# Patient Record
Sex: MALE | URBAN - METROPOLITAN AREA
[De-identification: names, ages, dates, MRNs, and addresses within clinical notes are randomized per-mention and may not be internally consistent; named-entity substitution may affect disease eponyms.]

---

## 2017-03-01 ENCOUNTER — INPATIENT (INPATIENT)
Facility: HOSPITAL | Age: 32
LOS: 3 days | Discharge: HOME | DRG: 639 | End: 2017-03-05
Attending: INTERNAL MEDICINE | Admitting: INTERNAL MEDICINE
Payer: MEDICAID

## 2017-03-01 VITALS
OXYGEN SATURATION: 96 % | RESPIRATION RATE: 18 BRPM | DIASTOLIC BLOOD PRESSURE: 80 MMHG | SYSTOLIC BLOOD PRESSURE: 138 MMHG | TEMPERATURE: 97 F | HEART RATE: 100 BPM | HEIGHT: 71 IN | WEIGHT: 220.02 LBS

## 2017-03-01 DIAGNOSIS — R73.9 HYPERGLYCEMIA, UNSPECIFIED: ICD-10-CM

## 2017-03-01 PROBLEM — Z00.00 ENCOUNTER FOR PREVENTIVE HEALTH EXAMINATION: Status: ACTIVE | Noted: 2017-03-01

## 2017-03-01 LAB
ALBUMIN SERPL ELPH-MCNC: 4.7 G/DL — SIGNIFICANT CHANGE UP (ref 3.3–5)
ALP SERPL-CCNC: 161 U/L — HIGH (ref 40–120)
ALT FLD-CCNC: 48 U/L RC — HIGH (ref 10–45)
ANION GAP SERPL CALC-SCNC: 20 MMOL/L — HIGH (ref 5–17)
APPEARANCE UR: CLEAR — SIGNIFICANT CHANGE UP
AST SERPL-CCNC: 18 U/L — SIGNIFICANT CHANGE UP (ref 10–40)
BASOPHILS # BLD AUTO: 0 K/UL — SIGNIFICANT CHANGE UP (ref 0–0.2)
BASOPHILS NFR BLD AUTO: 0.2 % — SIGNIFICANT CHANGE UP (ref 0–2)
BILIRUB SERPL-MCNC: 0.7 MG/DL — SIGNIFICANT CHANGE UP (ref 0.2–1.2)
BILIRUB UR-MCNC: NEGATIVE — SIGNIFICANT CHANGE UP
BUN SERPL-MCNC: 14 MG/DL — SIGNIFICANT CHANGE UP (ref 7–23)
CALCIUM SERPL-MCNC: 9.8 MG/DL — SIGNIFICANT CHANGE UP (ref 8.4–10.5)
CHLORIDE SERPL-SCNC: 86 MMOL/L — LOW (ref 96–108)
CO2 SERPL-SCNC: 22 MMOL/L — SIGNIFICANT CHANGE UP (ref 22–31)
COLOR SPEC: COLORLESS — SIGNIFICANT CHANGE UP
CREAT SERPL-MCNC: 0.96 MG/DL — SIGNIFICANT CHANGE UP (ref 0.5–1.3)
DIFF PNL FLD: NEGATIVE — SIGNIFICANT CHANGE UP
EOSINOPHIL # BLD AUTO: 0.1 K/UL — SIGNIFICANT CHANGE UP (ref 0–0.5)
EOSINOPHIL NFR BLD AUTO: 0.3 % — SIGNIFICANT CHANGE UP (ref 0–6)
GAS PNL BLDV: SIGNIFICANT CHANGE UP
GLUCOSE SERPL-MCNC: 737 MG/DL — CRITICAL HIGH (ref 70–99)
GLUCOSE UR QL: >1000
HBA1C BLD-MCNC: 14 % — HIGH (ref 4–5.6)
HCT VFR BLD CALC: 48.6 % — SIGNIFICANT CHANGE UP (ref 39–50)
HGB BLD-MCNC: 16.7 G/DL — SIGNIFICANT CHANGE UP (ref 13–17)
HIV 1 & 2 AB SERPL IA.RAPID: SIGNIFICANT CHANGE UP
KETONES UR-MCNC: ABNORMAL
LEUKOCYTE ESTERASE UR-ACNC: NEGATIVE — SIGNIFICANT CHANGE UP
LYMPHOCYTES # BLD AUTO: 1.4 K/UL — SIGNIFICANT CHANGE UP (ref 1–3.3)
LYMPHOCYTES # BLD AUTO: 9.2 % — LOW (ref 13–44)
MCHC RBC-ENTMCNC: 30.6 PG — SIGNIFICANT CHANGE UP (ref 27–34)
MCHC RBC-ENTMCNC: 34.3 GM/DL — SIGNIFICANT CHANGE UP (ref 32–36)
MCV RBC AUTO: 89.1 FL — SIGNIFICANT CHANGE UP (ref 80–100)
MONOCYTES # BLD AUTO: 0.8 K/UL — SIGNIFICANT CHANGE UP (ref 0–0.9)
MONOCYTES NFR BLD AUTO: 5.1 % — SIGNIFICANT CHANGE UP (ref 2–14)
NEUTROPHILS # BLD AUTO: 13.4 K/UL — HIGH (ref 1.8–7.4)
NEUTROPHILS NFR BLD AUTO: 85.2 % — HIGH (ref 43–77)
NITRITE UR-MCNC: NEGATIVE — SIGNIFICANT CHANGE UP
PH UR: 5.5 — SIGNIFICANT CHANGE UP (ref 4.8–8)
PLATELET # BLD AUTO: 282 K/UL — SIGNIFICANT CHANGE UP (ref 150–400)
POTASSIUM SERPL-MCNC: 4.5 MMOL/L — SIGNIFICANT CHANGE UP (ref 3.5–5.3)
POTASSIUM SERPL-SCNC: 4.5 MMOL/L — SIGNIFICANT CHANGE UP (ref 3.5–5.3)
PROT SERPL-MCNC: 7.1 G/DL — SIGNIFICANT CHANGE UP (ref 6–8.3)
PROT UR-MCNC: NEGATIVE — SIGNIFICANT CHANGE UP
RAPID RVP RESULT: SIGNIFICANT CHANGE UP
RBC # BLD: 5.46 M/UL — SIGNIFICANT CHANGE UP (ref 4.2–5.8)
RBC # FLD: 12 % — SIGNIFICANT CHANGE UP (ref 10.3–14.5)
RBC CASTS # UR COMP ASSIST: SIGNIFICANT CHANGE UP /HPF (ref 0–2)
SODIUM SERPL-SCNC: 128 MMOL/L — LOW (ref 135–145)
SP GR SPEC: >1.03 — HIGH (ref 1.01–1.02)
UROBILINOGEN FLD QL: NEGATIVE — SIGNIFICANT CHANGE UP
WBC # BLD: 15.7 K/UL — HIGH (ref 3.8–10.5)
WBC # FLD AUTO: 15.7 K/UL — HIGH (ref 3.8–10.5)
WBC UR QL: SIGNIFICANT CHANGE UP /HPF (ref 0–5)

## 2017-03-01 PROCEDURE — 99285 EMERGENCY DEPT VISIT HI MDM: CPT

## 2017-03-01 PROCEDURE — 71010: CPT | Mod: 26

## 2017-03-01 RX ORDER — SODIUM CHLORIDE 9 MG/ML
1000 INJECTION INTRAMUSCULAR; INTRAVENOUS; SUBCUTANEOUS
Qty: 0 | Refills: 0 | Status: DISCONTINUED | OUTPATIENT
Start: 2017-03-01 | End: 2017-03-01

## 2017-03-01 RX ORDER — DEXTROSE 50 % IN WATER 50 %
25 SYRINGE (ML) INTRAVENOUS ONCE
Qty: 0 | Refills: 0 | Status: DISCONTINUED | OUTPATIENT
Start: 2017-03-01 | End: 2017-03-05

## 2017-03-01 RX ORDER — GLUCAGON INJECTION, SOLUTION 0.5 MG/.1ML
1 INJECTION, SOLUTION SUBCUTANEOUS ONCE
Qty: 0 | Refills: 0 | Status: DISCONTINUED | OUTPATIENT
Start: 2017-03-01 | End: 2017-03-05

## 2017-03-01 RX ORDER — DEXTROSE 50 % IN WATER 50 %
1 SYRINGE (ML) INTRAVENOUS ONCE
Qty: 0 | Refills: 0 | Status: DISCONTINUED | OUTPATIENT
Start: 2017-03-01 | End: 2017-03-05

## 2017-03-01 RX ORDER — INSULIN GLARGINE 100 [IU]/ML
20 INJECTION, SOLUTION SUBCUTANEOUS AT BEDTIME
Qty: 0 | Refills: 0 | Status: DISCONTINUED | OUTPATIENT
Start: 2017-03-01 | End: 2017-03-02

## 2017-03-01 RX ORDER — INSULIN GLARGINE 100 [IU]/ML
15 INJECTION, SOLUTION SUBCUTANEOUS AT BEDTIME
Qty: 0 | Refills: 0 | Status: DISCONTINUED | OUTPATIENT
Start: 2017-03-01 | End: 2017-03-01

## 2017-03-01 RX ORDER — ENOXAPARIN SODIUM 100 MG/ML
40 INJECTION SUBCUTANEOUS EVERY 24 HOURS
Qty: 0 | Refills: 0 | Status: DISCONTINUED | OUTPATIENT
Start: 2017-03-01 | End: 2017-03-05

## 2017-03-01 RX ORDER — INSULIN HUMAN 100 [IU]/ML
10 INJECTION, SOLUTION SUBCUTANEOUS ONCE
Qty: 0 | Refills: 0 | Status: COMPLETED | OUTPATIENT
Start: 2017-03-01 | End: 2017-03-01

## 2017-03-01 RX ORDER — SODIUM CHLORIDE 9 MG/ML
1000 INJECTION INTRAMUSCULAR; INTRAVENOUS; SUBCUTANEOUS ONCE
Qty: 0 | Refills: 0 | Status: COMPLETED | OUTPATIENT
Start: 2017-03-01 | End: 2017-03-01

## 2017-03-01 RX ORDER — DEXTROSE 50 % IN WATER 50 %
12.5 SYRINGE (ML) INTRAVENOUS ONCE
Qty: 0 | Refills: 0 | Status: DISCONTINUED | OUTPATIENT
Start: 2017-03-01 | End: 2017-03-05

## 2017-03-01 RX ORDER — INSULIN LISPRO 100/ML
VIAL (ML) SUBCUTANEOUS AT BEDTIME
Qty: 0 | Refills: 0 | Status: DISCONTINUED | OUTPATIENT
Start: 2017-03-01 | End: 2017-03-05

## 2017-03-01 RX ORDER — ONDANSETRON 8 MG/1
4 TABLET, FILM COATED ORAL EVERY 6 HOURS
Qty: 0 | Refills: 0 | Status: DISCONTINUED | OUTPATIENT
Start: 2017-03-01 | End: 2017-03-05

## 2017-03-01 RX ORDER — INSULIN LISPRO 100/ML
VIAL (ML) SUBCUTANEOUS
Qty: 0 | Refills: 0 | Status: DISCONTINUED | OUTPATIENT
Start: 2017-03-01 | End: 2017-03-05

## 2017-03-01 RX ORDER — INSULIN LISPRO 100/ML
8 VIAL (ML) SUBCUTANEOUS
Qty: 0 | Refills: 0 | Status: DISCONTINUED | OUTPATIENT
Start: 2017-03-01 | End: 2017-03-02

## 2017-03-01 RX ORDER — SODIUM CHLORIDE 9 MG/ML
1000 INJECTION, SOLUTION INTRAVENOUS
Qty: 0 | Refills: 0 | Status: DISCONTINUED | OUTPATIENT
Start: 2017-03-01 | End: 2017-03-05

## 2017-03-01 RX ORDER — INSULIN LISPRO 100/ML
5 VIAL (ML) SUBCUTANEOUS
Qty: 0 | Refills: 0 | Status: DISCONTINUED | OUTPATIENT
Start: 2017-03-01 | End: 2017-03-01

## 2017-03-01 RX ADMIN — INSULIN HUMAN 10 UNIT(S): 100 INJECTION, SOLUTION SUBCUTANEOUS at 14:05

## 2017-03-01 RX ADMIN — Medication 8: at 22:29

## 2017-03-01 RX ADMIN — INSULIN GLARGINE 20 UNIT(S): 100 INJECTION, SOLUTION SUBCUTANEOUS at 22:30

## 2017-03-01 RX ADMIN — Medication 8 UNIT(S): at 18:32

## 2017-03-01 RX ADMIN — Medication 1 APPLICATION(S): at 14:40

## 2017-03-01 RX ADMIN — Medication 6: at 18:32

## 2017-03-01 RX ADMIN — ENOXAPARIN SODIUM 40 MILLIGRAM(S): 100 INJECTION SUBCUTANEOUS at 18:48

## 2017-03-01 RX ADMIN — SODIUM CHLORIDE 1000 MILLILITER(S): 9 INJECTION INTRAMUSCULAR; INTRAVENOUS; SUBCUTANEOUS at 13:05

## 2017-03-01 RX ADMIN — SODIUM CHLORIDE 1000 MILLILITER(S): 9 INJECTION INTRAMUSCULAR; INTRAVENOUS; SUBCUTANEOUS at 12:00

## 2017-03-01 NOTE — ED ADULT NURSE NOTE - OBJECTIVE STATEMENT
1130 31 yr old male c/o polyuria, polydipsia, and fatigue x 1 wk associated with wt loss Fingerstick was done twice by ER PCA upon arrival to   ER red registering  high without a #. A&Ox3. ambulatory. denies fever or chills .Also  c/o feeling bloated. abd soft. Lungs clear

## 2017-03-01 NOTE — ED PROVIDER NOTE - PROGRESS NOTE DETAILS
Symptoms concerning for new DM. Elevated glucose. Labs ordered. Mild gap of 20, elevated glucose of 737.  Insulin administered. Discussed with Dr. Harper to admit for hyperglycemia

## 2017-03-01 NOTE — H&P ADULT. - ASSESSMENT
31 yr old male with no PMH c/o polyuria, polydipsia, and fatigue x 1 wk associated with wt loss. In the ER pt found to have FS of 700 , so admitted for new onset dialbetes. No other associated symptoms

## 2017-03-01 NOTE — ED PROVIDER NOTE - OBJECTIVE STATEMENT
32 yo m with no PMH presents with chief complaint of nausea, generalized weakness, polyuria and polydipsia for the past 2 weeks. Reports weight loss of 15 pounds. Also reporting a white kurd like substance on his penis. The patient also is reporting less PO intake.

## 2017-03-01 NOTE — ED ADULT NURSE NOTE - ED STAT RN HANDOFF DETAILS
hand off given to oncoming RN Krista Montoya. 1450 Fingerstick repeated 359. A&Ox3. No c/o pain. Received 2 liters NS IV

## 2017-03-01 NOTE — ED PROVIDER NOTE - MEDICAL DECISION MAKING DETAILS
[age] yo male presents with days of nausea, polydipsia, polyuria, and fungal infection of penis; fs critical in ed; on exam nad, lungs cta, nontender abdomen, gu exam c/w fungal infection; Plan: r/o dka, labs, ua, cxr, clotrimazole, glucose control, admission

## 2017-03-02 LAB
ALBUMIN SERPL ELPH-MCNC: 3.6 G/DL — SIGNIFICANT CHANGE UP (ref 3.3–5)
ALP SERPL-CCNC: 115 U/L — SIGNIFICANT CHANGE UP (ref 40–120)
ALT FLD-CCNC: 33 U/L — SIGNIFICANT CHANGE UP (ref 10–45)
ANION GAP SERPL CALC-SCNC: 14 MMOL/L — SIGNIFICANT CHANGE UP (ref 5–17)
AST SERPL-CCNC: 17 U/L — SIGNIFICANT CHANGE UP (ref 10–40)
B-OH-BUTYR SERPL-SCNC: 0.8 MMOL/L — HIGH
BILIRUB SERPL-MCNC: 0.4 MG/DL — SIGNIFICANT CHANGE UP (ref 0.2–1.2)
BUN SERPL-MCNC: 10 MG/DL — SIGNIFICANT CHANGE UP (ref 7–23)
CALCIUM SERPL-MCNC: 8.9 MG/DL — SIGNIFICANT CHANGE UP (ref 8.4–10.5)
CHLORIDE SERPL-SCNC: 98 MMOL/L — SIGNIFICANT CHANGE UP (ref 96–108)
CO2 SERPL-SCNC: 22 MMOL/L — SIGNIFICANT CHANGE UP (ref 22–31)
CREAT SERPL-MCNC: 1.07 MG/DL — SIGNIFICANT CHANGE UP (ref 0.5–1.3)
CULTURE RESULTS: NO GROWTH — SIGNIFICANT CHANGE UP
GLUCOSE SERPL-MCNC: 484 MG/DL — CRITICAL HIGH (ref 70–99)
HCT VFR BLD CALC: 41 % — SIGNIFICANT CHANGE UP (ref 39–50)
HGB BLD-MCNC: 14.8 G/DL — SIGNIFICANT CHANGE UP (ref 13–17)
MCHC RBC-ENTMCNC: 31 PG — SIGNIFICANT CHANGE UP (ref 27–34)
MCHC RBC-ENTMCNC: 36.1 GM/DL — HIGH (ref 32–36)
MCV RBC AUTO: 85.8 FL — SIGNIFICANT CHANGE UP (ref 80–100)
PLATELET # BLD AUTO: 233 K/UL — SIGNIFICANT CHANGE UP (ref 150–400)
POTASSIUM SERPL-MCNC: 3.9 MMOL/L — SIGNIFICANT CHANGE UP (ref 3.5–5.3)
POTASSIUM SERPL-SCNC: 3.9 MMOL/L — SIGNIFICANT CHANGE UP (ref 3.5–5.3)
PROT SERPL-MCNC: 6.1 G/DL — SIGNIFICANT CHANGE UP (ref 6–8.3)
RBC # BLD: 4.78 M/UL — SIGNIFICANT CHANGE UP (ref 4.2–5.8)
RBC # FLD: 12.6 % — SIGNIFICANT CHANGE UP (ref 10.3–14.5)
SODIUM SERPL-SCNC: 134 MMOL/L — LOW (ref 135–145)
SPECIMEN SOURCE: SIGNIFICANT CHANGE UP
WBC # BLD: 11.53 K/UL — HIGH (ref 3.8–10.5)
WBC # FLD AUTO: 11.53 K/UL — HIGH (ref 3.8–10.5)

## 2017-03-02 RX ORDER — INSULIN GLARGINE 100 [IU]/ML
40 INJECTION, SOLUTION SUBCUTANEOUS AT BEDTIME
Qty: 0 | Refills: 0 | Status: DISCONTINUED | OUTPATIENT
Start: 2017-03-02 | End: 2017-03-03

## 2017-03-02 RX ORDER — ACETAMINOPHEN 500 MG
650 TABLET ORAL ONCE
Qty: 0 | Refills: 0 | Status: COMPLETED | OUTPATIENT
Start: 2017-03-02 | End: 2017-03-02

## 2017-03-02 RX ORDER — INSULIN LISPRO 100/ML
16 VIAL (ML) SUBCUTANEOUS
Qty: 0 | Refills: 0 | Status: DISCONTINUED | OUTPATIENT
Start: 2017-03-02 | End: 2017-03-03

## 2017-03-02 RX ORDER — INSULIN LISPRO 100/ML
10 VIAL (ML) SUBCUTANEOUS
Qty: 0 | Refills: 0 | Status: DISCONTINUED | OUTPATIENT
Start: 2017-03-02 | End: 2017-03-02

## 2017-03-02 RX ORDER — SODIUM CHLORIDE 9 MG/ML
1000 INJECTION INTRAMUSCULAR; INTRAVENOUS; SUBCUTANEOUS
Qty: 0 | Refills: 0 | Status: DISCONTINUED | OUTPATIENT
Start: 2017-03-02 | End: 2017-03-03

## 2017-03-02 RX ADMIN — Medication 10 UNIT(S): at 12:13

## 2017-03-02 RX ADMIN — Medication 650 MILLIGRAM(S): at 22:15

## 2017-03-02 RX ADMIN — INSULIN GLARGINE 40 UNIT(S): 100 INJECTION, SOLUTION SUBCUTANEOUS at 22:00

## 2017-03-02 RX ADMIN — SODIUM CHLORIDE 150 MILLILITER(S): 9 INJECTION INTRAMUSCULAR; INTRAVENOUS; SUBCUTANEOUS at 19:57

## 2017-03-02 RX ADMIN — Medication 10 UNIT(S): at 08:24

## 2017-03-02 RX ADMIN — Medication 650 MILLIGRAM(S): at 23:15

## 2017-03-02 RX ADMIN — Medication 4: at 22:00

## 2017-03-02 RX ADMIN — Medication 5: at 08:23

## 2017-03-02 RX ADMIN — Medication 4: at 12:13

## 2017-03-02 RX ADMIN — Medication 16 UNIT(S): at 17:10

## 2017-03-02 RX ADMIN — SODIUM CHLORIDE 150 MILLILITER(S): 9 INJECTION INTRAMUSCULAR; INTRAVENOUS; SUBCUTANEOUS at 05:58

## 2017-03-02 RX ADMIN — SODIUM CHLORIDE 150 MILLILITER(S): 9 INJECTION INTRAMUSCULAR; INTRAVENOUS; SUBCUTANEOUS at 17:11

## 2017-03-02 RX ADMIN — Medication 4: at 17:09

## 2017-03-02 RX ADMIN — ENOXAPARIN SODIUM 40 MILLIGRAM(S): 100 INJECTION SUBCUTANEOUS at 18:18

## 2017-03-02 NOTE — DIETITIAN INITIAL EVALUATION ADULT. - NS AS NUTRI INTERV ED CONTENT3
Provided in depth diabetes diet education including reviewing which foods contain carbohydrate, portion sizes using my plate method, choosing whole grains instead of refined grains, combining protein with carbohydrate, basic carbohydrate counting and eating carbs consistently throughout the day, avoidance of concentrated sweets, maintaining a healthy weight, physical activity, and signs/symptoms and management of hypoglycemia-pt and family receptive to education, accepted written materials

## 2017-03-02 NOTE — DIETITIAN INITIAL EVALUATION ADULT. - NS AS NUTRI INTERV MEALS SNACK
1. Continue consistent carbohydrate diet as ordered, 2. Continue to review diet education as able, 3. Monitor PO intake, diet tolerance, weight trends, labs, and skin integrity, 4. RD to remain available as needed

## 2017-03-02 NOTE — DIETITIAN INITIAL EVALUATION ADULT. - ENERGY NEEDS
Pt seen for consult for new dx DM. Pt with no PMH admitted with polydipsia, polyuria, fatigue associated with wt loss, FS of 700 now found to have newly dx type 2 DM-endocrine following, diabetes education consult pending.     Ht:5'11" , Wt:201 lbs, BMI:28 kg/m2, IBW:172 lbs +/- 10%, %IBW: 117%  No edema, Skin intact

## 2017-03-02 NOTE — DIETITIAN INITIAL EVALUATION ADULT. - SIGNS/SYMPTOMS
Pt consuming <50%  baseline intake x 2-3 weeks, ~17 lb wt loss within 2-3 weeks Pt with newly dx type 2 DM

## 2017-03-02 NOTE — DIETITIAN INITIAL EVALUATION ADULT. - ORAL INTAKE PTA
poor/Pt reports decreased appetite/intake for 2-3 weeks PTA due to fatigue and excessive thirst, pt stated he would eat maybe 1 meal per day and drink fluids such as juices, sodas, water and other beverages. Prior to this pt was eating normally 3 meals per day plus snacks. Breakfast: eggs, hash browns, grilled cheese, Lunch: white rice, fried chicken, chinese food, Dinner: protein, starch, veggie. poor/Pt reports decreased appetite/intake for 2-3 weeks PTA due to fatigue and excessive thirst, pt stated he would eat maybe 1 meal per day and drink fluids such as juices, sodas, water and other beverages. Prior to this pt was eating normally 3 meals per day plus snacks. Breakfast: eggs, hash browns, grilled cheese, Lunch: white rice, fried chicken, chinese food, Dinner: protein, starch, veggie. Pt mostly drinks juices, vitamin water, gatorade, soda, etc.

## 2017-03-02 NOTE — DIETITIAN INITIAL EVALUATION ADULT. - OTHER INFO
Pt reports UBW of 218-220 lbs, pt stated he lost weight to lowest weight of 193 lbs-per pt he was weighed this yesterday in house, ? accuracy noted admission weight of 201 lbs (3/1). Pt with no food allergies, no micronutrient supplementation at home. Pt reports intermittent vomiting at home from over consuming fluids, no issues in house. Pt with last BM today. No difficulty chewing/swallowing. In house pt reports eating better 100% of meals plus foods from [employer].

## 2017-03-03 LAB
ANION GAP SERPL CALC-SCNC: 13 MMOL/L — SIGNIFICANT CHANGE UP (ref 5–17)
BUN SERPL-MCNC: 9 MG/DL — SIGNIFICANT CHANGE UP (ref 7–23)
CALCIUM SERPL-MCNC: 8.2 MG/DL — LOW (ref 8.4–10.5)
CHLORIDE SERPL-SCNC: 102 MMOL/L — SIGNIFICANT CHANGE UP (ref 96–108)
CK MB CFR SERPL CALC: 2.5 NG/ML — SIGNIFICANT CHANGE UP (ref 0–6.7)
CK MB CFR SERPL CALC: 2.7 NG/ML — SIGNIFICANT CHANGE UP (ref 0–6.7)
CK MB CFR SERPL CALC: 2.8 NG/ML — SIGNIFICANT CHANGE UP (ref 0–6.7)
CK SERPL-CCNC: 69 U/L — SIGNIFICANT CHANGE UP (ref 30–200)
CK SERPL-CCNC: 85 U/L — SIGNIFICANT CHANGE UP (ref 30–200)
CK SERPL-CCNC: 86 U/L — SIGNIFICANT CHANGE UP (ref 30–200)
CO2 SERPL-SCNC: 21 MMOL/L — LOW (ref 22–31)
CREAT SERPL-MCNC: 0.8 MG/DL — SIGNIFICANT CHANGE UP (ref 0.5–1.3)
GLUCOSE SERPL-MCNC: 232 MG/DL — HIGH (ref 70–99)
HCT VFR BLD CALC: 38.7 % — LOW (ref 39–50)
HGB BLD-MCNC: 13.4 G/DL — SIGNIFICANT CHANGE UP (ref 13–17)
MAGNESIUM SERPL-MCNC: 1.7 MG/DL — SIGNIFICANT CHANGE UP (ref 1.6–2.6)
MCHC RBC-ENTMCNC: 29.8 PG — SIGNIFICANT CHANGE UP (ref 27–34)
MCHC RBC-ENTMCNC: 34.6 GM/DL — SIGNIFICANT CHANGE UP (ref 32–36)
MCV RBC AUTO: 86.2 FL — SIGNIFICANT CHANGE UP (ref 80–100)
PHOSPHATE SERPL-MCNC: 3 MG/DL — SIGNIFICANT CHANGE UP (ref 2.5–4.5)
PLATELET # BLD AUTO: 228 K/UL — SIGNIFICANT CHANGE UP (ref 150–400)
POTASSIUM SERPL-MCNC: 3.6 MMOL/L — SIGNIFICANT CHANGE UP (ref 3.5–5.3)
POTASSIUM SERPL-SCNC: 3.6 MMOL/L — SIGNIFICANT CHANGE UP (ref 3.5–5.3)
RBC # BLD: 4.49 M/UL — SIGNIFICANT CHANGE UP (ref 4.2–5.8)
RBC # FLD: 12.9 % — SIGNIFICANT CHANGE UP (ref 10.3–14.5)
SODIUM SERPL-SCNC: 136 MMOL/L — SIGNIFICANT CHANGE UP (ref 135–145)
TROPONIN T SERPL-MCNC: <0.01 NG/ML — SIGNIFICANT CHANGE UP (ref 0–0.06)
WBC # BLD: 9.62 K/UL — SIGNIFICANT CHANGE UP (ref 3.8–10.5)
WBC # FLD AUTO: 9.62 K/UL — SIGNIFICANT CHANGE UP (ref 3.8–10.5)

## 2017-03-03 PROCEDURE — 93010 ELECTROCARDIOGRAM REPORT: CPT

## 2017-03-03 PROCEDURE — 71010: CPT | Mod: 26

## 2017-03-03 RX ORDER — INSULIN LISPRO 100/ML
20 VIAL (ML) SUBCUTANEOUS
Qty: 0 | Refills: 0 | Status: DISCONTINUED | OUTPATIENT
Start: 2017-03-03 | End: 2017-03-04

## 2017-03-03 RX ORDER — ASPIRIN/CALCIUM CARB/MAGNESIUM 324 MG
325 TABLET ORAL ONCE
Qty: 0 | Refills: 0 | Status: COMPLETED | OUTPATIENT
Start: 2017-03-03 | End: 2017-03-03

## 2017-03-03 RX ORDER — INSULIN GLARGINE 100 [IU]/ML
50 INJECTION, SOLUTION SUBCUTANEOUS AT BEDTIME
Qty: 0 | Refills: 0 | Status: DISCONTINUED | OUTPATIENT
Start: 2017-03-03 | End: 2017-03-04

## 2017-03-03 RX ORDER — SODIUM CHLORIDE 9 MG/ML
1000 INJECTION INTRAMUSCULAR; INTRAVENOUS; SUBCUTANEOUS
Qty: 0 | Refills: 0 | Status: DISCONTINUED | OUTPATIENT
Start: 2017-03-03 | End: 2017-03-05

## 2017-03-03 RX ADMIN — Medication 3: at 11:24

## 2017-03-03 RX ADMIN — INSULIN GLARGINE 50 UNIT(S): 100 INJECTION, SOLUTION SUBCUTANEOUS at 21:57

## 2017-03-03 RX ADMIN — SODIUM CHLORIDE 150 MILLILITER(S): 9 INJECTION INTRAMUSCULAR; INTRAVENOUS; SUBCUTANEOUS at 02:43

## 2017-03-03 RX ADMIN — Medication: at 21:57

## 2017-03-03 RX ADMIN — Medication 325 MILLIGRAM(S): at 06:34

## 2017-03-03 RX ADMIN — Medication 16 UNIT(S): at 08:31

## 2017-03-03 RX ADMIN — Medication 20 UNIT(S): at 17:07

## 2017-03-03 RX ADMIN — Medication 1: at 08:31

## 2017-03-03 RX ADMIN — Medication 16 UNIT(S): at 11:24

## 2017-03-03 RX ADMIN — Medication 4: at 17:07

## 2017-03-04 PROCEDURE — 93306 TTE W/DOPPLER COMPLETE: CPT | Mod: 26

## 2017-03-04 RX ORDER — INSULIN GLARGINE 100 [IU]/ML
60 INJECTION, SOLUTION SUBCUTANEOUS AT BEDTIME
Qty: 0 | Refills: 0 | Status: DISCONTINUED | OUTPATIENT
Start: 2017-03-04 | End: 2017-03-05

## 2017-03-04 RX ORDER — INSULIN LISPRO 100/ML
26 VIAL (ML) SUBCUTANEOUS
Qty: 0 | Refills: 0 | Status: DISCONTINUED | OUTPATIENT
Start: 2017-03-04 | End: 2017-03-05

## 2017-03-04 RX ADMIN — ENOXAPARIN SODIUM 40 MILLIGRAM(S): 100 INJECTION SUBCUTANEOUS at 17:10

## 2017-03-04 RX ADMIN — Medication 20 UNIT(S): at 12:34

## 2017-03-04 RX ADMIN — Medication 3: at 12:32

## 2017-03-04 RX ADMIN — Medication 2: at 17:09

## 2017-03-04 RX ADMIN — Medication 20 UNIT(S): at 08:21

## 2017-03-04 RX ADMIN — INSULIN GLARGINE 60 UNIT(S): 100 INJECTION, SOLUTION SUBCUTANEOUS at 23:27

## 2017-03-04 RX ADMIN — Medication 20 UNIT(S): at 17:10

## 2017-03-04 RX ADMIN — Medication 2: at 08:21

## 2017-03-04 NOTE — PROVIDER CONTACT NOTE (OTHER) - BACKGROUND
31 yr old male with no PMH c/o polyuria, polydipsia, and fatigue x 1 wk associated with wt loss. In the ER pt found to have FS of 700 ,
30y/o male admitted for hyperglycemia
Pt came in with elevated blood glucose and diagnosed with new onset of DM

## 2017-03-04 NOTE — PROVIDER CONTACT NOTE (OTHER) - ACTION/TREATMENT ORDERED:
NP made aware
NP Gump made aware. Ordered cardiac enzymes and cardiac work-up. CXR ordered. 2nd EKG done at 0618 with sinus bradycardia. O2 NC at 3L. Aspirin 325mg ordered when pt refused morhpine. Monitor pt
Med given as ordered.  No distress or discomfort noted.  Safety and comfort measure maintained.  Will continue to monitor.

## 2017-03-04 NOTE — PROVIDER CONTACT NOTE (OTHER) - ASSESSMENT
Pt said, old IV lock came off accidently when he shave . Pt refused to place new IV lock despite multiple encouragement,pt said he will leave tommorow. Educated about the importance of IV lock in emergency situation especially pt is diabetic
FS at 1830 came out 598, repeated immediately and resulted HI. No s/sx of high blood glucose noted at this time.  No distress or discomfort noted.
pt c/o chest pain when taking deep breaths. Vitals 116/74, heart rate 66, RR 18, 98% on RA. EKG done with sinus bradycardia sinus arrhythmia.

## 2017-03-05 VITALS
OXYGEN SATURATION: 96 % | SYSTOLIC BLOOD PRESSURE: 111 MMHG | DIASTOLIC BLOOD PRESSURE: 65 MMHG | RESPIRATION RATE: 18 BRPM | TEMPERATURE: 98 F | HEART RATE: 84 BPM

## 2017-03-05 PROCEDURE — 93018 CV STRESS TEST I&R ONLY: CPT

## 2017-03-05 PROCEDURE — 93016 CV STRESS TEST SUPVJ ONLY: CPT

## 2017-03-05 RX ORDER — ENOXAPARIN SODIUM 100 MG/ML
60 INJECTION SUBCUTANEOUS
Qty: 1 | Refills: 0 | OUTPATIENT
Start: 2017-03-05

## 2017-03-05 RX ORDER — INSULIN LISPRO 100/ML
20 VIAL (ML) SUBCUTANEOUS
Qty: 1 | Refills: 0 | OUTPATIENT
Start: 2017-03-05 | End: 2017-04-04

## 2017-03-05 RX ORDER — INSULIN LISPRO 100/ML
10 VIAL (ML) SUBCUTANEOUS ONCE
Qty: 0 | Refills: 0 | Status: COMPLETED | OUTPATIENT
Start: 2017-03-05 | End: 2017-03-05

## 2017-03-05 RX ADMIN — Medication 10 UNIT(S): at 13:38

## 2017-03-05 RX ADMIN — Medication 26 UNIT(S): at 17:09

## 2017-03-05 RX ADMIN — Medication 26 UNIT(S): at 08:06

## 2017-03-05 RX ADMIN — Medication: at 17:09

## 2017-03-05 RX ADMIN — Medication 2: at 08:05

## 2017-03-05 RX ADMIN — ENOXAPARIN SODIUM 40 MILLIGRAM(S): 100 INJECTION SUBCUTANEOUS at 17:10

## 2017-03-05 RX ADMIN — Medication: at 13:34

## 2017-03-05 NOTE — DISCHARGE NOTE ADULT - MEDICATION SUMMARY - MEDICATIONS TO TAKE
I will START or STAY ON the medications listed below when I get home from the hospital:    Alcohol swab pads  -- Indication: For DM care    Glucose meter lancets  -- Indication: For DM testing    Glucose test strips  -- Test blood sugar 4 times daily  -- Indication: For DM testing    Glucose meter  -- Use as directed  -- Indication: For Dm testing    Lantus Solostar Pen 100 units/mL subcutaneous solution  -- 60 unit(s) subcutaneous once a day (at bedtime)  -- Do not drink alcoholic beverages when taking this medication.  It is very important that you take or use this exactly as directed.  Do not skip doses or discontinue unless directed by your doctor.  Keep in refrigerator.  Do not freeze.    -- Indication: For Type 2 diabetes mellitus with hyperglycemia, unspecified long term insulin use status    HumaLOG KwikPen 100 units/mL subcutaneous solution  -- 20 unit(s) subcutaneous 3 times a day before meals  -- Do not drink alcoholic beverages when taking this medication.  It is very important that you take or use this exactly as directed.  Do not skip doses or discontinue unless directed by your doctor.  Keep in refrigerator.  Do not freeze.    -- Indication: For Type 2 diabetes mellitus with hyperglycemia, unspecified long term insulin use status

## 2017-03-05 NOTE — DISCHARGE NOTE ADULT - PLAN OF CARE
resolution of symptoms HOME CARE INSTRUCTIONS  For the next few days, avoid physical activities that bring on chest pain. Continue physical activities as directed.  Do not smoke.  Avoid drinking alcohol.   Only take over-the-counter or prescription medicine for pain, discomfort, or fever as directed by your caregiver.  Follow your caregiver's suggestions for further testing if your chest pain does not go away.  Keep any follow-up appointments you made. If you do not go to an appointment, you could develop lasting (chronic) problems with pain. If there is any problem keeping an appointment, you must call to reschedule.   SEEK MEDICAL CARE IF:  You think you are having problems from the medicine you are taking. Read your medicine instructions carefully.  Your chest pain does not go away, even after treatment.  You develop a rash with blisters on your chest.  SEEK IMMEDIATE MEDICAL CARE IF:  You have increased chest pain or pain that spreads to your arm, neck, jaw, back, or abdomen.   You develop shortness of breath, an increasing cough, or you are coughing up blood.  You have severe back or abdominal pain, feel nauseous, or vomit.  You develop severe weakness, fainting, or chills.  You have a fever.  THIS IS AN EMERGENCY. Do not wait to see if the pain will go away. Get medical help at once. Call your local emergency services. Do not drive yourself to the hospital. HgA1C this admission - 14  Make sure you get your HgA1c checked every three months.  If you take oral diabetes medications, check your blood glucose two times a day.  If you take insulin, check your blood glucose before meals and at bedtime.  It's important not to skip any meals.  Keep a log of your blood glucose results and always take it with you to your doctor appointments.  Keep a list of your current medications including injectables and over the counter medications and bring this medication list with you to all your doctor appointments.  If you have not seen your ophthalmologist this year call for appointment.  Check your feet daily for redness, sores, or openings. Do not self treat. If no improvement in two days call your primary care physician for an appointment.  Low blood sugar (hypoglycemia) is a blood sugar below 70mg/dl. Check your blood sugar if you feel signs/symptoms of hypoglycemia. If your blood sugar is below 70 take 15 grams of carbohydrates (ex 4 oz of apple juice, 3-4 glucose tablets, or 4-6 oz of regular soda) wait 15 minutes and repeat blood sugar to make sure it comes up above 70.  If your blood sugar is above 70 and you are due for a meal, have a meal.  If you are not due for a meal have a snack.  This snack helps keeps your blood sugar at a safe range.

## 2017-03-05 NOTE — DISCHARGE NOTE ADULT - PATIENT PORTAL LINK FT
“You can access the Follow[facility] Patient Portal, offered by [facility], by registering with the following website: [email or web address]”

## 2017-03-05 NOTE — DISCHARGE NOTE ADULT - HOSPITAL COURSE
to be completed by attending 31 yr old male with no PMH c/o polyuria, polydipsia, and fatigue x 1 wk associated with wt loss. In the ER pt found to have FS of 700 , so admitted for new onset dialbetes. No other associated symptoms   Pt started on insulin, fu with endocrine as outpt..  stress test -ve

## 2017-03-05 NOTE — DISCHARGE NOTE ADULT - CARE PLAN
Principal Discharge DX:	Chest pain  Goal:	resolution of symptoms  Instructions for follow-up, activity and diet:	HOME CARE INSTRUCTIONS  For the next few days, avoid physical activities that bring on chest pain. Continue physical activities as directed.  Do not smoke.  Avoid drinking alcohol.   Only take over-the-counter or prescription medicine for pain, discomfort, or fever as directed by your caregiver.  Follow your caregiver's suggestions for further testing if your chest pain does not go away.  Keep any follow-up appointments you made. If you do not go to an appointment, you could develop lasting (chronic) problems with pain. If there is any problem keeping an appointment, you must call to reschedule.   SEEK MEDICAL CARE IF:  You think you are having problems from the medicine you are taking. Read your medicine instructions carefully.  Your chest pain does not go away, even after treatment.  You develop a rash with blisters on your chest.  SEEK IMMEDIATE MEDICAL CARE IF:  You have increased chest pain or pain that spreads to your arm, neck, jaw, back, or abdomen.   You develop shortness of breath, an increasing cough, or you are coughing up blood.  You have severe back or abdominal pain, feel nauseous, or vomit.  You develop severe weakness, fainting, or chills.  You have a fever.  THIS IS AN EMERGENCY. Do not wait to see if the pain will go away. Get medical help at once. Call your local emergency services. Do not drive yourself to the hospital.  Secondary Diagnosis:	Type 2 diabetes mellitus with hyperglycemia, unspecified long term insulin use status  Instructions for follow-up, activity and diet:	HgA1C this admission - 14  Make sure you get your HgA1c checked every three months.  If you take oral diabetes medications, check your blood glucose two times a day.  If you take insulin, check your blood glucose before meals and at bedtime.  It's important not to skip any meals.  Keep a log of your blood glucose results and always take it with you to your doctor appointments.  Keep a list of your current medications including injectables and over the counter medications and bring this medication list with you to all your doctor appointments.  If you have not seen your ophthalmologist this year call for appointment.  Check your feet daily for redness, sores, or openings. Do not self treat. If no improvement in two days call your primary care physician for an appointment.  Low blood sugar (hypoglycemia) is a blood sugar below 70mg/dl. Check your blood sugar if you feel signs/symptoms of hypoglycemia. If your blood sugar is below 70 take 15 grams of carbohydrates (ex 4 oz of apple juice, 3-4 glucose tablets, or 4-6 oz of regular soda) wait 15 minutes and repeat blood sugar to make sure it comes up above 70.  If your blood sugar is above 70 and you are due for a meal, have a meal.  If you are not due for a meal have a snack.  This snack helps keeps your blood sugar at a safe range.

## 2017-03-05 NOTE — DISCHARGE NOTE ADULT - CARE PROVIDER_API CALL
Abbey Plaza), EndocrinologyMetabDiabetes; Internal Medicine  [address] [place] [address]  Phone: [phone]  Fax: [phone]    Bertrand Evangelista), Cardiovascular Disease; Internal Medicine  [address] [place] [address]  Phone: [phone]  Fax: [phone] Abbey Plaza), EndocrinologyMetabDiabetes; Internal Medicine  [address] [place] [address]  Phone: [phone]  Fax: [phone]    Caleb Camara), Cardiovascular Disease; Internal Medicine  [address] [facility] [address] [place] [address]  Phone: [phone]  Fax: [phone]

## 2017-03-05 NOTE — DISCHARGE NOTE ADULT - ADDITIONAL INSTRUCTIONS
Follow up with Cardiology in 1 week  Follow up with Endocrinologist in 1 week Follow up with Cardiology in 1 week  Follow up with Endocrinologist - DR Plaza [phone] in 1 week Follow up with Cardiology- Dr Camara in 1 week  Follow up with Endocrinologist - DR Plaza [phone] in 1 week

## 2017-03-06 LAB — GAD65 AB SER-MCNC: 0 NMOL/L — SIGNIFICANT CHANGE UP

## 2017-03-12 PROCEDURE — 85014 HEMATOCRIT: CPT

## 2017-03-12 PROCEDURE — 82962 GLUCOSE BLOOD TEST: CPT

## 2017-03-12 PROCEDURE — 84295 ASSAY OF SERUM SODIUM: CPT

## 2017-03-12 PROCEDURE — 93017 CV STRESS TEST TRACING ONLY: CPT

## 2017-03-12 PROCEDURE — 87633 RESP VIRUS 12-25 TARGETS: CPT

## 2017-03-12 PROCEDURE — 93306 TTE W/DOPPLER COMPLETE: CPT

## 2017-03-12 PROCEDURE — 82330 ASSAY OF CALCIUM: CPT

## 2017-03-12 PROCEDURE — 87086 URINE CULTURE/COLONY COUNT: CPT

## 2017-03-12 PROCEDURE — 82553 CREATINE MB FRACTION: CPT

## 2017-03-12 PROCEDURE — 80053 COMPREHEN METABOLIC PANEL: CPT

## 2017-03-12 PROCEDURE — 83605 ASSAY OF LACTIC ACID: CPT

## 2017-03-12 PROCEDURE — 71045 X-RAY EXAM CHEST 1 VIEW: CPT

## 2017-03-12 PROCEDURE — 84484 ASSAY OF TROPONIN QUANT: CPT

## 2017-03-12 PROCEDURE — 82803 BLOOD GASES ANY COMBINATION: CPT

## 2017-03-12 PROCEDURE — 85027 COMPLETE CBC AUTOMATED: CPT

## 2017-03-12 PROCEDURE — 81001 URINALYSIS AUTO W/SCOPE: CPT

## 2017-03-12 PROCEDURE — 82435 ASSAY OF BLOOD CHLORIDE: CPT

## 2017-03-12 PROCEDURE — 86341 ISLET CELL ANTIBODY: CPT

## 2017-03-12 PROCEDURE — 82009 KETONE BODYS QUAL: CPT

## 2017-03-12 PROCEDURE — 87581 M.PNEUMON DNA AMP PROBE: CPT

## 2017-03-12 PROCEDURE — 83735 ASSAY OF MAGNESIUM: CPT

## 2017-03-12 PROCEDURE — 80048 BASIC METABOLIC PNL TOTAL CA: CPT

## 2017-03-12 PROCEDURE — 82550 ASSAY OF CK (CPK): CPT

## 2017-03-12 PROCEDURE — 87798 DETECT AGENT NOS DNA AMP: CPT

## 2017-03-12 PROCEDURE — 99285 EMERGENCY DEPT VISIT HI MDM: CPT | Mod: 25

## 2017-03-12 PROCEDURE — 82947 ASSAY GLUCOSE BLOOD QUANT: CPT

## 2017-03-12 PROCEDURE — 87486 CHLMYD PNEUM DNA AMP PROBE: CPT

## 2017-03-12 PROCEDURE — 93005 ELECTROCARDIOGRAM TRACING: CPT

## 2017-03-12 PROCEDURE — 83036 HEMOGLOBIN GLYCOSYLATED A1C: CPT

## 2017-03-12 PROCEDURE — 84132 ASSAY OF SERUM POTASSIUM: CPT

## 2017-03-12 PROCEDURE — 86703 HIV-1/HIV-2 1 RESULT ANTBDY: CPT

## 2017-03-12 PROCEDURE — 82010 KETONE BODYS QUAN: CPT

## 2017-03-12 PROCEDURE — 84100 ASSAY OF PHOSPHORUS: CPT

## 2017-09-22 ENCOUNTER — EMERGENCY (EMERGENCY)
Facility: HOSPITAL | Age: 32
LOS: 1 days | Discharge: HOME | End: 2017-09-22
Attending: EMERGENCY MEDICINE | Admitting: EMERGENCY MEDICINE
Payer: MEDICAID

## 2017-09-22 VITALS
DIASTOLIC BLOOD PRESSURE: 69 MMHG | WEIGHT: 214.95 LBS | TEMPERATURE: 99 F | RESPIRATION RATE: 16 BRPM | HEART RATE: 81 BPM | SYSTOLIC BLOOD PRESSURE: 125 MMHG | HEIGHT: 71 IN

## 2017-09-22 PROCEDURE — 99284 EMERGENCY DEPT VISIT MOD MDM: CPT | Mod: 25

## 2017-09-22 NOTE — ED ADULT NURSE NOTE - OBJECTIVE STATEMENT
30 yo male coming from home with wife complaining of right foot pain, 10/10. Food is red, aprox 15cm of redness and some dryness between toes. Pt states he never comes to the hospital but he is in a lot of pain at this moment. As per pt, redness noticed yesterday and it is progressively getting bigger. Pain is worse today but started yesterday. Pt recently diagnosed with diabetes, stated insulin in march but states feeling bad when taken so stopped taking it two months ago. Patient is alerted and oriented x3, vitals wnl, no signs of acute distress noted at this moment. Heart sounds normal, lungs clear, abdomen soft, non distended. Pt denies having fever, nausea, vomiting. Pt able to walk. Will continue to monitor closely.

## 2017-09-22 NOTE — ED ADULT NURSE NOTE - DISCHARGE TEACHING
ff-up with PMD or medical clinic in 3-5 days. Take meds as prescribed. Return to ED for worsening Sx.

## 2017-09-23 VITALS
SYSTOLIC BLOOD PRESSURE: 123 MMHG | RESPIRATION RATE: 18 BRPM | HEART RATE: 70 BPM | TEMPERATURE: 98 F | OXYGEN SATURATION: 100 % | DIASTOLIC BLOOD PRESSURE: 70 MMHG

## 2017-09-23 LAB
ALBUMIN SERPL ELPH-MCNC: 4.5 G/DL — SIGNIFICANT CHANGE UP (ref 3.3–5)
ALP SERPL-CCNC: 82 U/L — SIGNIFICANT CHANGE UP (ref 40–120)
ALT FLD-CCNC: 23 U/L RC — SIGNIFICANT CHANGE UP (ref 10–45)
ANION GAP SERPL CALC-SCNC: 17 MMOL/L — SIGNIFICANT CHANGE UP (ref 5–17)
AST SERPL-CCNC: 15 U/L — SIGNIFICANT CHANGE UP (ref 10–40)
BASOPHILS # BLD AUTO: 0 K/UL — SIGNIFICANT CHANGE UP (ref 0–0.2)
BASOPHILS NFR BLD AUTO: 0 % — SIGNIFICANT CHANGE UP (ref 0–2)
BILIRUB SERPL-MCNC: 0.5 MG/DL — SIGNIFICANT CHANGE UP (ref 0.2–1.2)
BUN SERPL-MCNC: 17 MG/DL — SIGNIFICANT CHANGE UP (ref 7–23)
CALCIUM SERPL-MCNC: 9.2 MG/DL — SIGNIFICANT CHANGE UP (ref 8.4–10.5)
CHLORIDE SERPL-SCNC: 103 MMOL/L — SIGNIFICANT CHANGE UP (ref 96–108)
CO2 SERPL-SCNC: 26 MMOL/L — SIGNIFICANT CHANGE UP (ref 22–31)
CREAT SERPL-MCNC: 0.92 MG/DL — SIGNIFICANT CHANGE UP (ref 0.5–1.3)
EOSINOPHIL # BLD AUTO: SIGNIFICANT CHANGE UP (ref 0–0.5)
EOSINOPHIL NFR BLD AUTO: 3 % — SIGNIFICANT CHANGE UP (ref 0–6)
ERYTHROCYTE [SEDIMENTATION RATE] IN BLOOD: 4 MM/HR — SIGNIFICANT CHANGE UP (ref 0–15)
GAS PNL BLDV: SIGNIFICANT CHANGE UP
GLUCOSE SERPL-MCNC: 133 MG/DL — HIGH (ref 70–99)
HBA1C BLD-MCNC: 6.6 % — HIGH (ref 4–5.6)
HCT VFR BLD CALC: 44.1 % — SIGNIFICANT CHANGE UP (ref 39–50)
HGB BLD-MCNC: 15.3 G/DL — SIGNIFICANT CHANGE UP (ref 13–17)
LYMPHOCYTES # BLD AUTO: 27 % — SIGNIFICANT CHANGE UP (ref 13–44)
LYMPHOCYTES # BLD AUTO: 3.4 K/UL — HIGH (ref 1–3.3)
MCHC RBC-ENTMCNC: 31.9 PG — SIGNIFICANT CHANGE UP (ref 27–34)
MCHC RBC-ENTMCNC: 34.8 GM/DL — SIGNIFICANT CHANGE UP (ref 32–36)
MCV RBC AUTO: 91.6 FL — SIGNIFICANT CHANGE UP (ref 80–100)
MONOCYTES # BLD AUTO: SIGNIFICANT CHANGE UP (ref 0–0.9)
MONOCYTES NFR BLD AUTO: 8 % — SIGNIFICANT CHANGE UP (ref 2–14)
NEUTROPHILS # BLD AUTO: 7.9 K/UL — HIGH (ref 1.8–7.4)
NEUTROPHILS NFR BLD AUTO: 58 % — SIGNIFICANT CHANGE UP (ref 43–77)
PLATELET # BLD AUTO: 260 K/UL — SIGNIFICANT CHANGE UP (ref 150–400)
POTASSIUM SERPL-MCNC: 3.7 MMOL/L — SIGNIFICANT CHANGE UP (ref 3.5–5.3)
POTASSIUM SERPL-SCNC: 3.7 MMOL/L — SIGNIFICANT CHANGE UP (ref 3.5–5.3)
PROT SERPL-MCNC: 7 G/DL — SIGNIFICANT CHANGE UP (ref 6–8.3)
RBC # BLD: 4.81 M/UL — SIGNIFICANT CHANGE UP (ref 4.2–5.8)
RBC # FLD: 12 % — SIGNIFICANT CHANGE UP (ref 10.3–14.5)
SODIUM SERPL-SCNC: 146 MMOL/L — HIGH (ref 135–145)
WBC # BLD: 12.4 K/UL — HIGH (ref 3.8–10.5)
WBC # FLD AUTO: 12.4 K/UL — HIGH (ref 3.8–10.5)

## 2017-09-23 PROCEDURE — 82803 BLOOD GASES ANY COMBINATION: CPT

## 2017-09-23 PROCEDURE — 73630 X-RAY EXAM OF FOOT: CPT

## 2017-09-23 PROCEDURE — 99284 EMERGENCY DEPT VISIT MOD MDM: CPT | Mod: 25

## 2017-09-23 PROCEDURE — 73630 X-RAY EXAM OF FOOT: CPT | Mod: 26,RT

## 2017-09-23 PROCEDURE — 82330 ASSAY OF CALCIUM: CPT

## 2017-09-23 PROCEDURE — 83605 ASSAY OF LACTIC ACID: CPT

## 2017-09-23 PROCEDURE — 96374 THER/PROPH/DIAG INJ IV PUSH: CPT

## 2017-09-23 PROCEDURE — 84295 ASSAY OF SERUM SODIUM: CPT

## 2017-09-23 PROCEDURE — 80053 COMPREHEN METABOLIC PANEL: CPT

## 2017-09-23 PROCEDURE — 83036 HEMOGLOBIN GLYCOSYLATED A1C: CPT

## 2017-09-23 PROCEDURE — 85652 RBC SED RATE AUTOMATED: CPT

## 2017-09-23 PROCEDURE — 85027 COMPLETE CBC AUTOMATED: CPT

## 2017-09-23 PROCEDURE — 84132 ASSAY OF SERUM POTASSIUM: CPT

## 2017-09-23 PROCEDURE — 82947 ASSAY GLUCOSE BLOOD QUANT: CPT

## 2017-09-23 PROCEDURE — 85014 HEMATOCRIT: CPT

## 2017-09-23 PROCEDURE — 82435 ASSAY OF BLOOD CHLORIDE: CPT

## 2017-09-23 RX ORDER — SODIUM CHLORIDE 9 MG/ML
1000 INJECTION INTRAMUSCULAR; INTRAVENOUS; SUBCUTANEOUS ONCE
Qty: 0 | Refills: 0 | Status: COMPLETED | OUTPATIENT
Start: 2017-09-23 | End: 2017-09-23

## 2017-09-23 RX ADMIN — Medication 100 MILLIGRAM(S): at 02:28

## 2017-09-23 RX ADMIN — SODIUM CHLORIDE 1000 MILLILITER(S): 9 INJECTION INTRAMUSCULAR; INTRAVENOUS; SUBCUTANEOUS at 01:51

## 2017-09-23 NOTE — ED PROVIDER NOTE - MEDICAL DECISION MAKING DETAILS
Erika SUMMERS: 30 y/o male with DM and noncompliant with medications here with R foot erythema. Patient reports one day of swelling and redness of the lateral aspect of the dorsum of his R foot associated with pain. Patient stopped taking Humalog and Lantus 3 months ago. Denies fever, trauma, skin rash, lacerations, N/v/D, other areas with similar symptoms, CP, SOB or cough or polyurea or excessive thirst. Exam shows a male in NAD with mild erythema and redness and warmth on a small area (3 cm diameter) on the anterolateral aspect of the dorsum of the R foot. No crepitus noted. No lacerations or skin breakdown. DP and GA pulses palpable bilaterally. Lungs CTAB. Abd soft and nontender and nondistended. Consider Cellulitis vs Fungal infection vs Trauma vs Abscess (less likely). Plan CBC, CMP, VBG, Xray, ESR and reassess.

## 2017-09-23 NOTE — ED PROVIDER NOTE - PROGRESS NOTE DETAILS
Erika SUMMERS: Patient recommended to stay on CDU for further IV antibiotics but prefers to go home. Also patient declines the opportunity of seeing Endocrinology while in the ER. Clindamycin sent to the pharmacy. Patient to be seen privately in the next 5 days.

## 2017-09-23 NOTE — ED PROVIDER NOTE - OBJECTIVE STATEMENT
Erika SUMMERS: 30 y/o male with DM and noncompliant with medications here with R foot erythema. Patient reports one day of swelling and redness of the lateral aspect of the dorsum of his R foot associated with pain. Patient stopped taking Humalog and Lantus 3 months ago. Denies fever, trauma, skin rash, lacerations, N/v/D, other areas with similar symptoms, CP, SOB or cough or polyurea or excessive thirst. Exam shows a male in NAD with mild erythema and redness and warmth on a small area (3 cm diameter) on the anterolateral aspect of the dorsum of the R foot. No crepitus noted. No lacerations or skin breakdown. DP and IN pulses palpable bilaterally. Lungs CTAB. Abd soft and nontender and nondistended. Consider Cellulitis vs Fungal infection vs Trauma vs Abscess (less likely). Plan CBC, CMP, VBG, Xray, ESR and reassess

## 2019-11-21 NOTE — DIETITIAN INITIAL EVALUATION ADULT. - NS AS NUTRI DX NUTRIENT
Ned Gupta)  Orthopaedic Surgery  [address] [place] [address]  Phone: [phone]  Fax: [phone]  Follow Up Time:
Moderate, acute/Malnutrition

## 2020-02-04 NOTE — ED PROVIDER NOTE - MUSCULOSKELETAL NEGATIVE STATEMENT, MLM
[facility] [place]
no back pain, no gout, no musculoskeletal pain, no neck pain, and no weakness.

## 2021-04-27 ENCOUNTER — TRANSCRIPTION ENCOUNTER (OUTPATIENT)
Age: 36
End: 2021-04-27

## 2021-05-19 ENCOUNTER — TRANSCRIPTION ENCOUNTER (OUTPATIENT)
Age: 36
End: 2021-05-19

## 2021-11-08 NOTE — ED ADULT TRIAGE NOTE - SPO2 (%)
Call Center TCM Note      Responses   [facility] [place], operated by [facility] patient discharged from? Non-   Does the patient have one of the following disease processes/diagnoses(primary or secondary)? Other   TCM attempt successful? No   Unsuccessful attempts Attempt 1          Jeanette Selby RN    11/8/2021, 14:53 EST      
96

## 2023-04-28 NOTE — ED ADULT NURSE REASSESSMENT NOTE - NS ED NURSE REASSESS COMMENT FT1
Patient sleeping, easily to arouse, reports no pain at this moment. IV medication running. Vitals WNL Statement Selected

## 2025-01-01 NOTE — H&P ADULT. - HISTORY OF PRESENT ILLNESS
31 yr old male with no PMH c/o polyuria, polydipsia, and fatigue x 1 wk associated with wt loss. In the ER pt found to have FS of 700 , so admitted for new onset dialbetes. No other associated symptoms
I have re-evaluated the patient's fluid status and reviewed vital signs. Clinical perfusion assessment was performed.